# Patient Record
Sex: FEMALE | Race: WHITE
[De-identification: names, ages, dates, MRNs, and addresses within clinical notes are randomized per-mention and may not be internally consistent; named-entity substitution may affect disease eponyms.]

---

## 2020-01-19 ENCOUNTER — HOSPITAL ENCOUNTER (EMERGENCY)
Dept: HOSPITAL 56 - MW.ED | Age: 25
Discharge: HOME | End: 2020-01-19
Payer: SELF-PAY

## 2020-01-19 DIAGNOSIS — B96.89: ICD-10-CM

## 2020-01-19 DIAGNOSIS — N76.0: Primary | ICD-10-CM

## 2020-01-19 DIAGNOSIS — F17.210: ICD-10-CM

## 2020-01-19 DIAGNOSIS — K12.0: ICD-10-CM

## 2020-01-19 DIAGNOSIS — B37.0: ICD-10-CM

## 2020-01-19 PROCEDURE — 87591 N.GONORRHOEAE DNA AMP PROB: CPT

## 2020-01-19 PROCEDURE — 87660 TRICHOMONAS VAGIN DIR PROBE: CPT

## 2020-01-19 PROCEDURE — 99283 EMERGENCY DEPT VISIT LOW MDM: CPT

## 2020-01-19 PROCEDURE — 87510 GARDNER VAG DNA DIR PROBE: CPT

## 2020-01-19 PROCEDURE — 87480 CANDIDA DNA DIR PROBE: CPT

## 2020-01-19 PROCEDURE — 87491 CHLMYD TRACH DNA AMP PROBE: CPT

## 2020-01-19 NOTE — EDM.PDOC
ED HPI GENERAL MEDICAL PROBLEM





- General


Chief Complaint: General


Stated Complaint: MOUTH COMPLAINT


Time Seen by Provider: 20 11:49


Source of Information: Reports: Patient


History Limitations: Reports: No Limitations





- History of Present Illness


INITIAL COMMENTS - FREE TEXT/NARRATIVE: 


HISTORY AND PHYSICAL:





History of present illness:


Patient is a 24-year-old female who presents to the emergency room with 

complaints of some irritation along the lower gumline in her mouth.  She states 

she is also had some vaginal irritation/discomfort. Denies any vaginal discharge

, odor, or irregular bleeding.  She has recently moved to [place] to be 

with her  and had recently began having oral and vaginal intercourse (

states they hadn't had sex in awhile due to distance).  She is concerned she 

may have an STD in her mouth. 





Review of systems: 


As per history of present illness and below otherwise all systems reviewed and 

negative.





Past medical history: 


As per history of present illness and as reviewed below otherwise 

noncontributory.





Surgical history: 


As per history of present illness and as reviewed below otherwise 

noncontributory.





Social history: 


See social history for further information





Family history: 


As per history of present illness and as reviewed below otherwise 

noncontributory.





Physical exam:


General: Well-developed and well-nourished 24-year-old female.  Alert and 

oriented.  Nontoxic-appearing and in no acute distress.  Patient has 2 small 

children with her at bedside.


HEENT: Atraumatic, normocephalic, pupils equal and reactive bilaterally, 

negative for conjunctival pallor or scleral icterus, mucous membranes moist, 

irritation noted to the lower gumline along the bottom front teeth with small 

ulceration noted consistent with canker sore, TMs normal bilaterally, throat 

clear, neck supple, nontender, trachea midline. No drooling or trismus noted. 

No meningeal signs. No hot potato voice noted. 


Lungs: Clear to auscultation, breath sounds equal bilaterally, chest nontender.


Heart: S1S2, regular rate and rhythm without overt murmur


Abdomen: Soft, nondistended, nontender. Negative for masses or 

hepatosplenomegaly. Negative for costovertebral tenderness.


Pelvis: Stable nontender.


Skin: Intact, warm, dry. No lesions or rashes noted.


Extremities: Atraumatic, moves all extremities per self without difficulty or 

deficits, negative for cords or calf pain. Neurovascular unremarkable.


Neuro: Awake, alert, oriented. Cranial nerves II through XII unremarkable. 

Cerebellum unremarkable. Motor and sensory unremarkable throughout. Exam 

nonfocal.





Notes:


Patient declines wanting to have a pelvic exam.  She is agreeable to swabbing 

per self and waiting for the testing to return.  She is aware that gonorrhea 

and chlamydia as a send out.  We did discuss in great length that she needs to 

establish care and follow-up with primary or OB/GYN for further evaluation and 

management. Supportive care measures were reviewed and discussed. Voices 

understanding and is agreeable to plan of care. Denies any further questions or 

concerns at this time.





Diagnostics:


Chlam/[place], Tric/BV





Therapeutics:


Dental Balls





Prescription:


Flagyl, Diflucan





Impression: 


Bacterial vaginosis


Candidiasis


Canker sore





Plan:


1. Please abstain from sexual intercourse until the lab results have returned. 

Always use protection to minimized risk of STD exposure


2. Take the medications as directed. The gonorrhea and chlamydia tests are send 

out labs, therefore will not be available for 2-3 business days.


3. Please follow-up with your primary care provider in the next 1-2 days. [facility] does offer free STD testing, please follow-up with 

them for further STD testing needs. Return to the ED as needed and as discussed.





Definitive disposition and diagnosis as appropriate pending reevaluation and 

review of above.





  ** lower dental


Pain Score (Numeric/FACES): 10





- Related Data


 Allergies











Allergy/AdvReac Type Severity Reaction Status Date / Time


 


No Known Allergies Allergy   Verified 20 11:43











Home Meds: 


 Home Meds





. [No Known Home Meds]  20 [History]











Past Medical History


HEENT History: Reports: None


Cardiovascular History: Reports: None


Respiratory History: Reports: None


Gastrointestinal History: Reports: None


Genitourinary History: Reports: None


OB/GYN History: Reports: None, Pregnancy


Musculoskeletal History: Reports: None


Neurological History: Reports: None


Psychiatric History: Reports: None


Endocrine/Metabolic History: Reports: None


Hematologic History: Reports: None


Immunologic History: Reports: None


Oncologic (Cancer) History: Reports: None


Dermatologic History: Reports: None





- Past Surgical History


Head Surgeries/Procedures: Reports: None


HEENT Surgical History: Reports: None


Cardiovascular Surgical History: Reports: None


Respiratory Surgical History: Reports: None


GI Surgical History: Reports: None


Female  Surgical History: Reports:  Section


Endocrine Surgical History: Reports: None


Neurological Surgical History: Reports: None


Musculoskeletal Surgical History: Reports: None


Oncologic Surgical History: Reports: None


Dermatological Surgical History: Reports: None





Social & Family History





- Family History


Family Medical History: Noncontributory





- Tobacco Use


Smoking Status *Q: Current Every Day Smoker


Years of Tobacco use: 7


Packs/Tins Daily: 1





- Caffeine Use


Caffeine Use: Reports: None





- Recreational Drug Use


Recreational Drug Use: No





ED ROS GENERAL





- Review of Systems


Review Of Systems: Comprehensive ROS is negative, except as noted in HPI.





ED EXAM, GENERAL





- Physical Exam


Exam: See Below (See dictation)





Course





- Vital Signs


Last Recorded V/S: 


 Last Vital Signs











Temp  97.3 F   20 11:43


 


Pulse  83   20 11:43


 


Resp  18   20 11:43


 


BP  112/49 L  20 11:43


 


Pulse Ox  98   20 11:43














- Orders/Labs/Meds


Orders: 


 Active Orders 24 hr











 Category Date Time Status


 


 CHLAMYDIA AND GONORRHEA BY [facility]  20 11:56 Received











Labs: 


 Laboratory Tests











  20 Range/Units





  11:56 


 


Candida species DNA  POSITIVE H  (NEGATIVE)  


 


Gardnerella DNA Probe  POSITIVE H  (NEGATIVE)  


 


Trichomonas DNA Probe  NEGATIVE  (NEGATIVE)  











Meds: 


Medications














Discontinued Medications














Generic Name Dose Route Start Last Admin





  Trade Name Freq  PRN Reason Stop Dose Admin


 


Benzocaine  2 each  20 11:57  20 12:07





  Hurricaine One 20%  MUCMEM  20 11:58  2 each





  ONETIME ONE   Administration





     





     





     





     


 


Lidocaine HCl  15 ml  20 11:57  20 12:07





  Xylocaine 2% Viscous  PO  20 11:58  15 ml





  ONETIME ONE   Administration





     





     





     





     














Departure





- Departure


Time of Disposition: 13:03


Disposition: Home, Self-Care 01


Clinical Impression: 


 Bacterial vaginosis, Candidiasis, Canker sores oral








- Discharge Information


Instructions:  Bacterial Vaginosis, Easy-to-Read


Referrals: 


PCP,None [Primary Care Provider] - 


Forms:  ED Department Discharge


Additional Instructions: 


The following information is given to patients seen in the emergency department 

who are being discharged to home. This information is to outline your options 

for follow-up care. We provide all patients seen in our emergency department 

with a follow-up referral.





The need for follow-up, as well as the timing and circumstances, are variable 

depending upon the specifics of your emergency department visit.





If you don't have a primary care physician on staff, we will provide you with a 

referral. We always advise you to contact your personal physician following an 

emergency department visit to inform them of the circumstance of the visit and 

for follow-up with them and/or the need for any referrals to a consulting 

specialist.





The emergency department will also refer you to a specialist when appropriate. 

This referral assures that you have the opportunity for follow-up care with a 

specialist. All of these measure are taken in an effort to provide you with 

optimal care, which includes your follow-up.





Under all circumstances we always encourage you to contact your private 

physician who remains a resource for coordinating your care. When calling for 

follow-up care, please make the office aware that this follow-up is from your 

recent emergency room visit. If for any reason you are refused follow-up, 

please contact the [facility] Emergency 

Department at [phone] and asked to speak to the emergency department 

charge nurse.





[facility]


Primary Care


[address] [place] [address]


Phone: [phone]


Fax: [phone]





[place] [address]


Phone: [phone]


Fax: [phone]





1. Please abstain from sexual intercourse until the lab results have returned. 

Always use protection to minimized risk of STD exposure


2. Take the medications as directed.  Do not have any form of sexual 

intercourse while taking the Flagyl or the bacterial vaginosis will not 

resolve.  The gonorrhea and chlamydia tests are send out labs, therefore will 

not be available for 2-3 business days.


3. Please follow-up with your primary care provider in the next 1-2 days. [facility] does offer free STD testing, please follow-up with 

them for further STD testing needs. Return to the ED as needed and as discussed.








Sepsis Event Note





- Evaluation


Sepsis Screening Result: No Definite Risk





- Focused Exam


Vital Signs: 


 Vital Signs











  Temp Pulse Resp BP Pulse Ox


 


 20 11:43  97.3 F  83  18  112/49 L  98











Date Exam was Performed: 20


Time Exam was Performed: 13:08





- My Orders


Last 24 Hours: 


My Active Orders





20 11:56


CHLAMYDIA AND GONORRHEA BY [facility] 














- Assessment/Plan


Last 24 Hours: 


My Active Orders





20 11:56


CHLAMYDIA AND GONORRHEA BY [facility]

## 2020-03-23 ENCOUNTER — HOSPITAL ENCOUNTER (EMERGENCY)
Dept: HOSPITAL 56 - MW.ED | Age: 25
LOS: 1 days | Discharge: HOME | End: 2020-03-24
Payer: SELF-PAY

## 2020-03-23 DIAGNOSIS — S91.311A: Primary | ICD-10-CM

## 2020-03-23 DIAGNOSIS — F17.210: ICD-10-CM

## 2020-03-23 DIAGNOSIS — Z23: ICD-10-CM
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
## 2020-03-23 PROCEDURE — 73620 X-RAY EXAM OF FOOT: CPT

## 2020-03-23 PROCEDURE — 99283 EMERGENCY DEPT VISIT LOW MDM: CPT

## 2020-03-23 PROCEDURE — 90471 IMMUNIZATION ADMIN: CPT

## 2020-03-23 PROCEDURE — 12002 RPR S/N/AX/GEN/TRNK2.6-7.5CM: CPT

## 2020-03-23 PROCEDURE — 90715 TDAP VACCINE 7 YRS/> IM: CPT

## 2020-03-23 PROCEDURE — 36415 COLL VENOUS BLD VENIPUNCTURE: CPT

## 2020-03-23 PROCEDURE — 84703 CHORIONIC GONADOTROPIN ASSAY: CPT

## 2020-03-24 NOTE — CR
Indication:



Laceration. Foreign body 



Technique:



Two views of the right foot



Comparison:



None available



Findings:



Bones: Alignment is normal. No fractures or bone lesions.  



Joint spaces: Unremarkable.  



Soft tissues: Soft tissue irregularity/injury at the plantar aspect of the 

forefoot, at the level of the proximal phalanges, with regional soft tissue 

swelling. No discrete radiopaque foreign body visualized. 



Impression:



No acute fracture or dislocation. Plantar forefoot soft tissue injury. No 

discrete radiopaque soft tissue foreign body seen.



Dictated by Javon Nagy MD @ 3/24/2020 12:35:15 AM



Dictated by: Javon Nagy MD @ 03/24/2020 00:35:20



(Electronically Signed)

## 2020-03-24 NOTE — EDM.PDOC
ED HPI GENERAL MEDICAL PROBLEM





- General


Chief Complaint: Lower Extremity Injury/Pain


Stated Complaint: LEFT LEG BLEEDING


Time Seen by Provider: 20 23:09


Source of Information: Reports: Patient


History Limitations: Reports: No Limitations





- History of Present Illness


INITIAL COMMENTS - FREE TEXT/NARRATIVE: 





This is a 24-year-old female who presents to the emergency room after stepping 

on a glass.  Patient has the dorsal aspect of her foot bleeding.  Patient has a 

large 5 cm laceration to the dorsal aspect of her foot around the ball of her 

foot.  Patient does not know whether she has glass in her foot.  Patient is 

uncertain about her tetanus status


Onset: Today


Duration: Hour(s):


Location: Reports: Lower Extremity, Left


Severity: Mild


Improves with: Reports: None


Context: Reports: Activity


Associated Symptoms: Reports: No Other Symptoms


  ** righht foot


Pain Score (Numeric/FACES): 10





- Related Data


 Allergies











Allergy/AdvReac Type Severity Reaction Status Date / Time


 


No Known Allergies Allergy   Verified 20 23:17











Home Meds: 


 Home Meds





. [No Known Home Meds]  20 [History]











Past Medical History


HEENT History: Reports: None


Cardiovascular History: Reports: None


Respiratory History: Reports: None


Gastrointestinal History: Reports: None


Genitourinary History: Reports: None


OB/GYN History: Reports: None, Pregnancy


Musculoskeletal History: Reports: None


Neurological History: Reports: None


Psychiatric History: Reports: None


Endocrine/Metabolic History: Reports: None


Hematologic History: Reports: None


Immunologic History: Reports: None


Oncologic (Cancer) History: Reports: None


Dermatologic History: Reports: None





- Infectious Disease History


Infectious Disease History: Reports: None





- Past Surgical History


Head Surgeries/Procedures: Reports: None


HEENT Surgical History: Reports: None


Cardiovascular Surgical History: Reports: None


Respiratory Surgical History: Reports: None


GI Surgical History: Reports: None


Female  Surgical History: Reports:  Section


Endocrine Surgical History: Reports: None


Neurological Surgical History: Reports: None


Musculoskeletal Surgical History: Reports: None


Oncologic Surgical History: Reports: None


Dermatological Surgical History: Reports: None





Social & Family History





- Family History


Family Medical History: Noncontributory





- Tobacco Use


Smoking Status *Q: Current Every Day Smoker


Years of Tobacco use: 6


Packs/Tins Daily: 1





- Caffeine Use


Caffeine Use: Reports: None





- Recreational Drug Use


Recreational Drug Use: No





Review of Systems





- Review of Systems


Review Of Systems: See Below


Constitutional: Reports: No Symptoms


Eyes: Reports: No Symptoms


Ears: Reports: No Symptoms


Nose: Reports: No Symptoms


Mouth/Throat: Reports: No Symptoms


Respiratory: Reports: No Symptoms


Cardiovascular: Reports: No Symptoms


GI/Abdominal: Reports: No Symptoms


Genitourinary: Reports: No Symptoms


Musculoskeletal: Reports: No Symptoms


Skin: Reports: Other (5 cm laceration to the foot)


Neurological: Reports: No Symptoms


Psychiatric: Reports: No Symptoms





ED EXAM, GENERAL





- Physical Exam


Exam: See Below


Exam Limited By: No Limitations


General Appearance: Alert, WD/WN, No Apparent Distress


Ears: Normal External Exam, Normal Canal


Nose: Normal Inspection, Normal Mucosa


Throat/Mouth: Normal Inspection


Head: Atraumatic


Neck: Normal Inspection


Respiratory/Chest: No Respiratory Distress


Cardiovascular: Normal Peripheral Pulses


Rectal (Female) Exam: Deferred


Back Exam: Normal Inspection


Extremities: Other (Patient has a 5 cm laceration to the ball of her left foot)


Neurological: Alert, Oriented, CN II-XII Intact


Psychiatric: Normal Affect


Skin Exam: Warm


Lymphatic: No Adenopathy





ED TRAUMA EXTREMITY PROCEDURES





- Laceration/Wound Repair


  ** Left Foot


Lac/Wound Length In cm: 5


Appearance: Subcutaneous, Mildly Contaminated


Distal NVT: Neuro & Vascular Intact, No Tendon Injury


Anesthetic Type: Local


Local Anesthesia - Lidocaine (Xylocaine): 1% Plain


Local Anesthetic Volume: 5cc


Skin Prep: Chlorhexidine (Hibiciens), Providone-Iodine (Betadine)


Saline Irrigation (cc's): 20


Exploration/Debridement/Repair: Wound Explored, In a Bloodless Field


Suture Size: 4-0


# of Sutures: 9


Suture Type: Nylon


Drain Placement: No


Sterile Dressing Applied: Provider


Tetanus Status Addressed: Yes


Complications: No





Course





- Vital Signs


Last Recorded V/S: 





 Last Vital Signs











Temp  98.1 F   20 23:18


 


Pulse  130 H  20 23:18


 


Resp  18   20 23:18


 


BP  112/74   20 23:18


 


Pulse Ox  99   20 23:18














- Orders/Labs/Meds


Orders: 





 Active Orders 24 hr











 Category Date Time Status


 


 Vaccines to be Administered [RC] PER UNIT ROUTINE Care  20 23:17 Active











Labs: 





 Laboratory Tests











  20 Range/Units





  23:33 


 


HCG, Qual  NEGATIVE  (NEG)  











Meds: 





Medications














Discontinued Medications














Generic Name Dose Route Start Last Admin





  Trade Name Dinh  PRN Reason Stop Dose Admin


 


Diphtheria/Tetanus/Acell Pertussis  0.5 ml  20 23:17  





  Adacel  IM  20 23:18  





  .ONCE ONE   





     





     





     





     


 


Lidocaine HCl  10 ml  20 00:20  





  Xylocaine 1%  INJECT  20 00:21  





  ONETIME ONE   





     





     





     





     


 


Lidocaine HCl  Confirm  20 00:23  





  Xylocaine-Mpf 1%  Administered  20 00:24  





  Dose   





  10 ml   





  .ROUTE   





  .STK-MED ONE   





     





     





     





     














Departure





- Departure


Time of Disposition: 01:05


Disposition: Home, Self-Care 01


Condition: Good


Clinical Impression: 


 Laceration of left foot








- Discharge Information


Instructions:  Laceration Care, Adult, Sutured Wound Care, Easy-to-Read


Referrals: 


PCP,None [Primary Care Provider] - 


Additional Instructions: 


Patient to follow-up in 7 to 10 days for suture removal


Patient to return for severe pain swelling or redness of the foot.


Remove dressing after 24 hours.











Sepsis Event Note





- Evaluation


Sepsis Screening Result: No Definite Risk





- Focused Exam


Vital Signs: 





 Vital Signs











  Temp Pulse Resp BP Pulse Ox


 


 20 23:18  98.1 F  130 H  18  112/74  99











Date Exam was Performed: 20


Time Exam was Performed: 00:59





- My Orders


Last 24 Hours: 





My Active Orders





20 23:17


Vaccines to be Administered [RC] PER UNIT ROUTINE 














- Assessment/Plan


Last 24 Hours: 





My Active Orders





20 23:17


Vaccines to be Administered [RC] PER UNIT ROUTINE

## 2020-03-31 ENCOUNTER — HOSPITAL ENCOUNTER (EMERGENCY)
Dept: HOSPITAL 56 - MW.ED | Age: 25
Discharge: HOME | End: 2020-03-31
Payer: SELF-PAY

## 2020-03-31 DIAGNOSIS — Z53.21: Primary | ICD-10-CM
